# Patient Record
Sex: FEMALE | Race: WHITE | NOT HISPANIC OR LATINO | Employment: FULL TIME | URBAN - METROPOLITAN AREA
[De-identification: names, ages, dates, MRNs, and addresses within clinical notes are randomized per-mention and may not be internally consistent; named-entity substitution may affect disease eponyms.]

---

## 2018-04-13 ENCOUNTER — OFFICE VISIT (OUTPATIENT)
Dept: OBSTETRICS AND GYNECOLOGY | Facility: CLINIC | Age: 33
End: 2018-04-13
Payer: COMMERCIAL

## 2018-04-13 VITALS
SYSTOLIC BLOOD PRESSURE: 121 MMHG | HEIGHT: 64 IN | DIASTOLIC BLOOD PRESSURE: 81 MMHG | BODY MASS INDEX: 36.37 KG/M2 | WEIGHT: 213 LBS

## 2018-04-13 DIAGNOSIS — Z12.4 SCREENING FOR CERVICAL CANCER: ICD-10-CM

## 2018-04-13 DIAGNOSIS — Z11.3 SCREENING FOR GONORRHEA: ICD-10-CM

## 2018-04-13 DIAGNOSIS — Z01.419 ENCOUNTER FOR GYNECOLOGICAL EXAMINATION (GENERAL) (ROUTINE) WITHOUT ABNORMAL FINDINGS: Primary | ICD-10-CM

## 2018-04-13 DIAGNOSIS — B17.10 ACUTE HEPATITIS C VIRUS INFECTION WITHOUT HEPATIC COMA: ICD-10-CM

## 2018-04-13 PROCEDURE — 88175 CYTOPATH C/V AUTO FLUID REDO: CPT

## 2018-04-13 PROCEDURE — 99385 PREV VISIT NEW AGE 18-39: CPT | Mod: S$GLB,,, | Performed by: OBSTETRICS & GYNECOLOGY

## 2018-04-13 PROCEDURE — 99999 PR PBB SHADOW E&M-NEW PATIENT-LVL II: CPT | Mod: PBBFAC,,, | Performed by: OBSTETRICS & GYNECOLOGY

## 2018-04-13 PROCEDURE — 87491 CHLMYD TRACH DNA AMP PROBE: CPT

## 2018-04-13 RX ORDER — VALACYCLOVIR HYDROCHLORIDE 500 MG/1
500 TABLET, FILM COATED ORAL
COMMUNITY
Start: 2018-03-13 | End: 2018-04-19 | Stop reason: SDUPTHER

## 2018-04-13 RX ORDER — NAPROXEN 500 MG/1
250 TABLET ORAL
COMMUNITY
Start: 2016-12-27 | End: 2019-12-13 | Stop reason: ALTCHOICE

## 2018-04-13 RX ORDER — FLUTICASONE FUROATE AND VILANTEROL TRIFENATATE 200; 25 UG/1; UG/1
POWDER RESPIRATORY (INHALATION)
COMMUNITY
Start: 2018-03-05 | End: 2018-11-26

## 2018-04-13 RX ORDER — TEMAZEPAM 30 MG/1
30 CAPSULE ORAL ONCE
COMMUNITY
Start: 2018-03-29 | End: 2021-12-01

## 2018-04-13 RX ORDER — EPINEPHRINE 0.15 MG/.3ML
0.15 INJECTION INTRAMUSCULAR
COMMUNITY
End: 2021-12-01

## 2018-04-13 RX ORDER — DEXTROAMPHETAMINE SACCHARATE, AMPHETAMINE ASPARTATE, DEXTROAMPHETAMINE SULFATE AND AMPHETAMINE SULFATE 5; 5; 5; 5 MG/1; MG/1; MG/1; MG/1
1 TABLET ORAL 2 TIMES DAILY
COMMUNITY
Start: 2018-04-06 | End: 2018-11-26 | Stop reason: SDUPTHER

## 2018-04-13 RX ORDER — LEDIPASVIR AND SOFOSBUVIR 90; 400 MG/1; MG/1
90-400 TABLET, FILM COATED ORAL ONCE
COMMUNITY
Start: 2018-04-09 | End: 2018-11-26

## 2018-04-13 NOTE — PROGRESS NOTES
Subjective:       Patient ID: Radha Mai is a 33 y.o. female.    Chief Complaint:  Gynecologic Exam      History of Present Illness  HPI  Annual Exam-Premenopausal  Patient presents for annual exam. The patient has no complaints today. The patient is sexually active--but ab. GYN screening history: last pap: approximate date 2017 and was normal. The patient wears seatbelts: yes. The patient participates in regular exercise: no. Has the patient ever been transfused or tattooed?: no. The patient reports that there is not domestic violence in her life.    Menses monthly, flow 4-5 d; pads--super; change q 6 hrs; no double up; no soak through; mod dysmenorrhea--relief with naprosyn;         GYN & OB History  Patient's last menstrual period was 2018.   Date of Last Pap: No result found    OB History    Para Term  AB Living   0 0 0 0 0 0   SAB TAB Ectopic Multiple Live Births   0 0 0 0 0             Review of Systems  Review of Systems   Constitutional: Negative for activity change, appetite change, chills, diaphoresis, fatigue, fever and unexpected weight change.   HENT: Negative for mouth sores and tinnitus.    Eyes: Negative for discharge and visual disturbance.   Respiratory: Negative for cough, shortness of breath and wheezing.    Cardiovascular: Negative for chest pain, palpitations and leg swelling.   Gastrointestinal: Negative for abdominal pain, bloating, blood in stool, constipation, diarrhea, nausea and vomiting.   Endocrine: Negative for diabetes, hair loss, hot flashes, hyperthyroidism and hypothyroidism.   Genitourinary: Negative for decreased libido, dyspareunia, dysuria, flank pain, frequency, genital sores, hematuria, menorrhagia, menstrual problem, pelvic pain, urgency, vaginal bleeding, vaginal discharge, vaginal pain, dysmenorrhea, urinary incontinence, postcoital bleeding, postmenopausal bleeding and vaginal odor.   Musculoskeletal: Negative for back pain and myalgias.   Skin:   Negative for rash, no acne and hair changes.   Neurological: Negative for seizures, syncope, numbness and headaches.   Hematological: Negative for adenopathy. Does not bruise/bleed easily.   Psychiatric/Behavioral: Negative for depression and sleep disturbance. The patient is not nervous/anxious.    Breast: Negative for breast mass, breast pain, nipple discharge and skin changes          Objective:    Physical Exam:   Constitutional: She appears well-developed.     Eyes: Conjunctivae and EOM are normal. Pupils are equal, round, and reactive to light.    Neck: Normal range of motion. Neck supple.     Pulmonary/Chest: Effort normal. Right breast exhibits no mass, no nipple discharge, no skin change and no tenderness. Left breast exhibits no mass, no nipple discharge, no skin change and no tenderness. Breasts are symmetrical.        Abdominal: Soft.     Genitourinary: Rectum normal, vagina normal and uterus normal. Pelvic exam was performed with patient supine. Cervix is normal. Right adnexum displays no mass and no tenderness. Left adnexum displays no mass and no tenderness. No erythema, bleeding, rectocele, cystocele or unspecified prolapse of vaginal walls in the vagina. No vaginal discharge found. Labial bartholins normal.       Uterus Size: 6 cm   Musculoskeletal: Normal range of motion.       Neurological: She is alert.    Skin: Skin is warm.    Psychiatric: She has a normal mood and affect.          Assessment:     Encounter Diagnoses   Name Primary?    Encounter for gynecological examination (general) (routine) without abnormal findings Yes    Screening for cervical cancer     Screening for gonorrhea     Acute hepatitis C virus infection without hepatic coma                Plan:      Continue annual well woman exam.  Pap today; aware due in 2020, prefers pap to be taken  Gc/ct today  Safe sex  Encouraged diet, exercise, weight loss  Reviewed plan b 1

## 2018-04-14 LAB
C TRACH DNA SPEC QL NAA+PROBE: NOT DETECTED
N GONORRHOEA DNA SPEC QL NAA+PROBE: NOT DETECTED

## 2018-04-19 NOTE — TELEPHONE ENCOUNTER
----- Message from Trinidad Ivory sent at 4/19/2018 11:08 AM CDT -----  Contact: pt   1. What is the name of the medication you are requesting? valACYclovir (VALTREX)  2. What is the dose? 500 mg   3. How do you take the medication? Orally, topically, etc? Orally   4. How often do you take this medication? As needed   5. Do you need a 30 day or 90 day supply? 30  6. How many refills are you requesting? 1  7. What is your preferred pharmacy and location of the pharmacy?   [facility] [address] [address]  Phone: [phone] Fax: [phone]  8. Who can we contact with further questions? the patient

## 2018-04-20 NOTE — TELEPHONE ENCOUNTER
----- Message from Rabia Rivers sent at 4/20/2018  2:35 PM CDT -----  Contact: pt  1. What is the name of the medication you are requesting? Generic valtrex  2. What is the dose? ?  3. How do you take the medication? Orally, topically, etc? orally  4. How often do you take this medication? As needed  5. Do you need a 30 day or 90 day supply? ?  6. How many refills are you requesting? ?  7. What is your preferred pharmacy and location of the pharmacy?   [facility] [address] [address]  Phone: [phone] Fax: [phone]  8. Who can we contact with further questions? Pt at [phone]  Thank you

## 2018-04-20 NOTE — TELEPHONE ENCOUNTER
Pt's last annual 04/13/2018. Pt requesting refill on valtrex. Medication pended. Pharmacy up to date. Please advise.

## 2018-04-23 RX ORDER — VALACYCLOVIR HYDROCHLORIDE 500 MG/1
500 TABLET, FILM COATED ORAL DAILY
Qty: 30 TABLET | Refills: 11 | Status: SHIPPED | OUTPATIENT
Start: 2018-04-23 | End: 2019-12-13 | Stop reason: SDUPTHER

## 2018-11-26 ENCOUNTER — LAB VISIT (OUTPATIENT)
Dept: LAB | Facility: HOSPITAL | Age: 33
End: 2018-11-26
Attending: OBSTETRICS & GYNECOLOGY
Payer: COMMERCIAL

## 2018-11-26 ENCOUNTER — OFFICE VISIT (OUTPATIENT)
Dept: OBSTETRICS AND GYNECOLOGY | Facility: CLINIC | Age: 33
End: 2018-11-26
Payer: COMMERCIAL

## 2018-11-26 VITALS — SYSTOLIC BLOOD PRESSURE: 124 MMHG | DIASTOLIC BLOOD PRESSURE: 84 MMHG | WEIGHT: 229.5 LBS | BODY MASS INDEX: 39.39 KG/M2

## 2018-11-26 DIAGNOSIS — N92.6 IRREGULAR BLEEDING: ICD-10-CM

## 2018-11-26 DIAGNOSIS — N92.6 IRREGULAR BLEEDING: Primary | ICD-10-CM

## 2018-11-26 PROCEDURE — 83520 IMMUNOASSAY QUANT NOS NONAB: CPT

## 2018-11-26 PROCEDURE — 3008F BODY MASS INDEX DOCD: CPT | Mod: CPTII,S$GLB,, | Performed by: OBSTETRICS & GYNECOLOGY

## 2018-11-26 PROCEDURE — 36415 COLL VENOUS BLD VENIPUNCTURE: CPT | Mod: PO

## 2018-11-26 PROCEDURE — 99213 OFFICE O/P EST LOW 20 MIN: CPT | Mod: S$GLB,,, | Performed by: OBSTETRICS & GYNECOLOGY

## 2018-11-26 PROCEDURE — 99999 PR PBB SHADOW E&M-EST. PATIENT-LVL III: CPT | Mod: PBBFAC,,, | Performed by: OBSTETRICS & GYNECOLOGY

## 2018-11-26 RX ORDER — ONDANSETRON 4 MG/1
TABLET, ORALLY DISINTEGRATING ORAL
COMMUNITY
Start: 2018-08-06 | End: 2021-12-01

## 2018-11-26 RX ORDER — DEXTROAMPHETAMINE SACCHARATE, AMPHETAMINE ASPARTATE MONOHYDRATE, DEXTROAMPHETAMINE SULFATE AND AMPHETAMINE SULFATE 7.5; 7.5; 7.5; 7.5 MG/1; MG/1; MG/1; MG/1
30 CAPSULE, EXTENDED RELEASE ORAL EVERY MORNING
COMMUNITY
End: 2021-12-01

## 2018-11-26 RX ORDER — IBUPROFEN 800 MG/1
TABLET ORAL
COMMUNITY
Start: 2018-08-06 | End: 2021-12-01

## 2018-11-26 RX ORDER — ALBUTEROL SULFATE 90 UG/1
AEROSOL, METERED RESPIRATORY (INHALATION)
COMMUNITY
Start: 2018-07-25 | End: 2021-12-01

## 2018-11-26 RX ORDER — MONTELUKAST SODIUM 10 MG/1
TABLET ORAL
COMMUNITY
Start: 2018-11-16 | End: 2021-12-01

## 2018-11-26 NOTE — PROGRESS NOTES
Subjective:       Patient ID: Radha Mai is a 33 y.o. female.    Chief Complaint:  Metrorrhagia      History of Present Illness  HPI  here for problem   Worried that menstrual cycle is changing --sometimes 1 day late or 1 day early  Also reports having bled for 1 d in October then bleeding stopped then resumed  Trying to conceive--worried  weigth 6lb higher since 2018    GYN & OB History  Patient's last menstrual period was 10/28/2018 (exact date).   Date of Last Pap: 2018    OB History    Para Term  AB Living   2 0 0 0 2 0   SAB TAB Ectopic Multiple Live Births   2 0 0 0 0      # Outcome Date GA Lbr Rosalio/2nd Weight Sex Delivery Anes PTL Lv   2 SAB            1 2012                  Review of Systems  Review of Systems   Genitourinary: Positive for menstrual problem.   All other systems reviewed and are negative.          Objective:      Physical Exam:   Constitutional: She appears well-developed.     Eyes: Conjunctivae and EOM are normal. Pupils are equal, round, and reactive to light.    Neck: Normal range of motion. Neck supple.     Pulmonary/Chest: Effort normal.        Abdominal: Soft.             Musculoskeletal: Normal range of motion.       Neurological: She is alert.    Skin: Skin is warm.    Psychiatric: She has a normal mood and affect.           Assessment:        1. Irregular bleeding               Plan:      Reassurance given  Strongly encouraged weight loss, diet, exercise  Continue menstrual calendar; reviewed use of ovulation predictor kits; however emphasized weight loss of 30-50lbs  amh today  Aware ww exam due after 2019      no fever

## 2018-11-29 LAB — MIS SERPL-MCNC: 1.6 NG/ML (ref 0.9–9.5)

## 2019-12-10 ENCOUNTER — TELEPHONE (OUTPATIENT)
Dept: OBSTETRICS AND GYNECOLOGY | Facility: CLINIC | Age: 34
End: 2019-12-10

## 2019-12-10 NOTE — TELEPHONE ENCOUNTER
Left messages for the pt. to call back. slee,lpn    Pt is due for a WWE also wants her acyclovir refilled. slee,lpn      ----- Message from Farhat Cooper sent at 12/10/2019 12:33 PM CST -----  Contact: pt   Type:  RX Refill Request    Who Called:  Pt   Refill or New Rx:refill   RX Name and Strength:valcyclovir ( sp)   How is the patient currently taking it? (ex. 1XDay): once daily   Is this a 30 day or 90 day RX: 90 days   Preferred Pharm[facility] with phone number:[facility]   Local or Mail Order: local    Ordering Provider:jeff   Would the patient rather a call back or a response via [facility]? phone  Best Call Back Number: [phone]  Additional Information:     [facility] [place] - [address]  [address] [address]  Phone: [phone] Fax: [phone]

## 2019-12-13 ENCOUNTER — OFFICE VISIT (OUTPATIENT)
Dept: OBSTETRICS AND GYNECOLOGY | Facility: CLINIC | Age: 34
End: 2019-12-13
Payer: COMMERCIAL

## 2019-12-13 VITALS
DIASTOLIC BLOOD PRESSURE: 82 MMHG | BODY MASS INDEX: 37.52 KG/M2 | SYSTOLIC BLOOD PRESSURE: 118 MMHG | WEIGHT: 218.56 LBS

## 2019-12-13 DIAGNOSIS — Z12.4 SCREENING FOR CERVICAL CANCER: ICD-10-CM

## 2019-12-13 DIAGNOSIS — Z72.51 HIGH RISK HETEROSEXUAL BEHAVIOR: ICD-10-CM

## 2019-12-13 DIAGNOSIS — Z01.419 ENCOUNTER FOR GYNECOLOGICAL EXAMINATION (GENERAL) (ROUTINE) WITHOUT ABNORMAL FINDINGS: Primary | ICD-10-CM

## 2019-12-13 PROCEDURE — 87801 DETECT AGNT MULT DNA AMPLI: CPT

## 2019-12-13 PROCEDURE — 99395 PREV VISIT EST AGE 18-39: CPT | Mod: S$GLB,,, | Performed by: OBSTETRICS & GYNECOLOGY

## 2019-12-13 PROCEDURE — 99999 PR PBB SHADOW E&M-EST. PATIENT-LVL III: CPT | Mod: PBBFAC,,, | Performed by: OBSTETRICS & GYNECOLOGY

## 2019-12-13 PROCEDURE — 99395 PR PREVENTIVE VISIT,EST,18-39: ICD-10-PCS | Mod: S$GLB,,, | Performed by: OBSTETRICS & GYNECOLOGY

## 2019-12-13 PROCEDURE — 87491 CHLMYD TRACH DNA AMP PROBE: CPT

## 2019-12-13 PROCEDURE — 87481 CANDIDA DNA AMP PROBE: CPT | Mod: 59

## 2019-12-13 PROCEDURE — 99999 PR PBB SHADOW E&M-EST. PATIENT-LVL III: ICD-10-PCS | Mod: PBBFAC,,, | Performed by: OBSTETRICS & GYNECOLOGY

## 2019-12-13 RX ORDER — FLUTICASONE PROPIONATE 50 MCG
1 SPRAY, SUSPENSION (ML) NASAL 2 TIMES DAILY
Refills: 0 | COMMUNITY
Start: 2019-10-28 | End: 2021-12-01

## 2019-12-13 RX ORDER — VALACYCLOVIR HYDROCHLORIDE 500 MG/1
500 TABLET, FILM COATED ORAL DAILY
Qty: 30 TABLET | Refills: 0 | Status: SHIPPED | OUTPATIENT
Start: 2019-12-13 | End: 2019-12-13 | Stop reason: SDUPTHER

## 2019-12-13 RX ORDER — VALACYCLOVIR HYDROCHLORIDE 500 MG/1
500 TABLET, FILM COATED ORAL DAILY
Qty: 90 TABLET | Refills: 3 | Status: SHIPPED | OUTPATIENT
Start: 2019-12-13 | End: 2022-03-03 | Stop reason: SDUPTHER

## 2019-12-13 RX ORDER — CETIRIZINE HYDROCHLORIDE 10 MG/1
10 TABLET ORAL DAILY
Refills: 3 | COMMUNITY
Start: 2019-10-28 | End: 2021-12-01

## 2019-12-13 RX ORDER — MUPIROCIN 20 MG/G
OINTMENT TOPICAL
Refills: 0 | COMMUNITY
Start: 2019-11-16 | End: 2021-12-01

## 2019-12-13 RX ORDER — BUDESONIDE AND FORMOTEROL FUMARATE DIHYDRATE 160; 4.5 UG/1; UG/1
AEROSOL RESPIRATORY (INHALATION)
Refills: 0 | COMMUNITY
Start: 2019-12-05 | End: 2021-12-01

## 2019-12-13 RX ORDER — ASPIRIN 325 MG
50000 TABLET, DELAYED RELEASE (ENTERIC COATED) ORAL
Refills: 3 | COMMUNITY
Start: 2019-11-12 | End: 2021-12-01

## 2019-12-13 NOTE — PROGRESS NOTES
Subjective:       Patient ID: Radha Mai is a 34 y.o. female.    Chief Complaint:  Well Woman      History of Present Illness  HPI  Annual Exam-Premenopausal  Patient presents for annual exam. The patient has no complaints today. The patient is sexually active--condoms for contracept; did have condom break; used plan b 1 wk ago; . GYN screening history: last pap: approximate date 2018 and was normal. The patient wears seatbelts: yes. The patient participates in regular exercise: yes. --walks 3-4 times/wk; 30 min; Has the patient ever been transfused or tattooed?: no. The patient reports that there is not domestic violence in her life.      Menses monthly,  Flow 4-5 day; pads-reg; change q 4 hrs; no double up; dysmenorrhea worse on 1st and 2nd day sometimes the last day; relief with ibuprofen;     Wants refill on valtrex  Denies urinary leakage with cough/sneeze    GYN & OB History  Patient's last menstrual period was 2019.   Date of Last Pap: 2018    OB History    Para Term  AB Living   2 0 0 0 2 0   SAB TAB Ectopic Multiple Live Births   2 0 0 0 0      # Outcome Date GA Lbr Rosalio/2nd Weight Sex Delivery Anes PTL Lv   2 SAB            1 2012               Review of Systems  Review of Systems   All other systems reviewed and are negative.          Objective:      Physical Exam:   Constitutional: She appears well-developed.     Eyes: Pupils are equal, round, and reactive to light. Conjunctivae and EOM are normal.    Neck: Normal range of motion. Neck supple.     Pulmonary/Chest: Effort normal. Right breast exhibits no mass, no nipple discharge, no skin change and no tenderness. Left breast exhibits no mass, no nipple discharge, no skin change and no tenderness. Breasts are symmetrical.        Abdominal: Soft.     Genitourinary: Rectum normal, vagina normal and uterus normal. Pelvic exam was performed with patient supine. Cervix is normal. Right adnexum displays no mass and no  tenderness. Left adnexum displays no mass and no tenderness. No erythema, bleeding, rectocele, cystocele or unspecified prolapse of vaginal walls in the vagina. No vaginal discharge found. Labial bartholins normal.       Uterus Size: 6 cm   Musculoskeletal: Normal range of motion.       Neurological: She is alert.    Skin: Skin is warm.    Psychiatric: She has a normal mood and affect.           Assessment:        Encounter Diagnoses   Name Primary?    Encounter for gynecological examination (general) (routine) without abnormal findings Yes    Screening for cervical cancer     High risk heterosexual behavior                Plan:      Continue annual well woman exam.  Pap 2018, due in 2021; Reviewed updated recommendations for pap smears (every 3 years) in low risk patients.   Recommend annual pelvic exams.  Reviewed recommendations for annual CBE.  Gc/ct/affirm today  Declines hiv/rpr testing  Continue diet, exercise, weight loss  Continue ibuprofen for dysmenorrhea  Continue daily valtrex

## 2019-12-15 LAB
C TRACH DNA SPEC QL NAA+PROBE: NOT DETECTED
N GONORRHOEA DNA SPEC QL NAA+PROBE: NOT DETECTED

## 2019-12-16 LAB
BACTERIAL VAGINOSIS DNA: NEGATIVE
CANDIDA GLABRATA DNA: NEGATIVE
CANDIDA KRUSEI DNA: NEGATIVE
CANDIDA RRNA VAG QL PROBE: NEGATIVE
T VAGINALIS RRNA GENITAL QL PROBE: NEGATIVE

## 2020-05-04 ENCOUNTER — TELEPHONE (OUTPATIENT)
Dept: OBSTETRICS AND GYNECOLOGY | Facility: CLINIC | Age: 35
End: 2020-05-04

## 2020-05-04 ENCOUNTER — PATIENT MESSAGE (OUTPATIENT)
Dept: ADMINISTRATIVE | Facility: OTHER | Age: 35
End: 2020-05-04

## 2020-05-04 NOTE — TELEPHONE ENCOUNTER
----- Message from Cherrie Luis sent at 5/4/2020  9:37 AM CDT -----  Contact: pt  1. What is the name of the medication you are requesting? Birth control  2. What is the dose? .  3. How do you take the medication? Orally, topically, etc? .  4. How often do you take this medication? .  5. Do you need a 30 day or 90 day supply? .  6. How many refills are you requesting? .  7. What is your preferred pharmacy and location of the pharmacy? [facility] in [place]  8. Who can we contact with further questions? .

## 2020-05-07 ENCOUNTER — OFFICE VISIT (OUTPATIENT)
Dept: OBSTETRICS AND GYNECOLOGY | Facility: CLINIC | Age: 35
End: 2020-05-07
Payer: COMMERCIAL

## 2020-05-07 DIAGNOSIS — Z30.011 ENCOUNTER FOR INITIAL PRESCRIPTION OF CONTRACEPTIVE PILLS: Primary | ICD-10-CM

## 2020-05-07 PROCEDURE — 99214 PR OFFICE/OUTPT VISIT, EST, LEVL IV, 30-39 MIN: ICD-10-PCS | Mod: 95,,, | Performed by: OBSTETRICS & GYNECOLOGY

## 2020-05-07 PROCEDURE — 99214 OFFICE O/P EST MOD 30 MIN: CPT | Mod: 95,,, | Performed by: OBSTETRICS & GYNECOLOGY

## 2020-05-07 RX ORDER — NORGESTIMATE AND ETHINYL ESTRADIOL 7DAYSX3 28
1 KIT ORAL DAILY
Qty: 28 TABLET | Refills: 7 | Status: SHIPPED | OUTPATIENT
Start: 2020-05-07 | End: 2020-05-19 | Stop reason: SDUPTHER

## 2020-05-07 NOTE — PROGRESS NOTES
Subjective:       Patient ID: Radha Mai is a 35 y.o. female.    Chief Complaint:  No chief complaint on file.      History of Present Illness  HPI     The patient location is: home, [place]  The chief complaint leading to consultation is: contraception  Visit type: audiovisual  Total time spent with patient: 15 min  Each patient to whom he or she provides medical services by telemedicine is:  (1) informed of the relationship between the physician and patient and the respective role of any other health care provider with respect to management of the patient; and (2) notified that he or she may decline to receive medical services by telemedicine and may withdraw from such care at any time.    Pt reports interest in resuming contraception  Has been off ocp for 5 yrs  Interested in resuming due to cystic acne as well as pregnancy prevention  Reports menses monthly, flow mod heavy and reports dysmenorrhea  Non smoker  ww exam due in 2020  GYN & OB History  No LMP recorded.   Date of Last Pap: 2018    OB History    Para Term  AB Living   2 0 0 0 2 0   SAB TAB Ectopic Multiple Live Births   2 0 0 0 0      # Outcome Date GA Lbr Rosalio/2nd Weight Sex Delivery Anes PTL Lv   2 2012           1 2012               Review of Systems  Review of Systems   Integumentary:  Positive for acne.   All other systems reviewed and are negative.          Objective:      Physical Exam:   Constitutional: She is oriented to person, place, and time. She appears well-developed and well-nourished. No distress.     Eyes: Conjunctivae are normal.    Neck: Normal range of motion.     Pulmonary/Chest: Effort normal.                  Musculoskeletal: Normal range of motion.       Neurological: She is alert and oriented to person, place, and time.    Skin: She is not diaphoretic.    Psychiatric: She has a normal mood and affect. Her behavior is normal. Judgment and thought content normal.           Assessment:          Encounter Diagnosis   Name Primary?    Encounter for initial prescription of contraceptive pills Yes          Plan:   Reviewed contraceptive options--ocp, depo, nuva ring, nexplanon, iud, patch  Reviewed uses of each, risks and benefits.  Pt elects trial of ocp  Reviewed Sunday start, safe sex; daily use of ocp; aware may have irregular bleeding for first 3 mo  Reminded may have breast tenderness; nausea; symptoms should resolve by mo 4  rx sent for ortho tricyclen  Aware ww exam due 12/2020

## 2020-05-18 ENCOUNTER — PATIENT MESSAGE (OUTPATIENT)
Dept: OBSTETRICS AND GYNECOLOGY | Facility: CLINIC | Age: 35
End: 2020-05-18

## 2020-05-18 DIAGNOSIS — Z30.011 ENCOUNTER FOR INITIAL PRESCRIPTION OF CONTRACEPTIVE PILLS: Primary | ICD-10-CM

## 2020-05-19 ENCOUNTER — PATIENT MESSAGE (OUTPATIENT)
Dept: OBSTETRICS AND GYNECOLOGY | Facility: CLINIC | Age: 35
End: 2020-05-19

## 2020-05-19 RX ORDER — NORGESTIMATE AND ETHINYL ESTRADIOL 7DAYSX3 28
1 KIT ORAL DAILY
Qty: 84 TABLET | Refills: 3 | Status: SHIPPED | OUTPATIENT
Start: 2020-05-19 | End: 2021-05-03 | Stop reason: SDUPTHER

## 2021-04-29 ENCOUNTER — PATIENT MESSAGE (OUTPATIENT)
Dept: RESEARCH | Facility: HOSPITAL | Age: 36
End: 2021-04-29

## 2021-05-03 ENCOUNTER — TELEPHONE (OUTPATIENT)
Dept: OBSTETRICS AND GYNECOLOGY | Facility: CLINIC | Age: 36
End: 2021-05-03

## 2021-05-03 DIAGNOSIS — Z30.011 ENCOUNTER FOR INITIAL PRESCRIPTION OF CONTRACEPTIVE PILLS: ICD-10-CM

## 2021-05-03 RX ORDER — NORGESTIMATE AND ETHINYL ESTRADIOL 7DAYSX3 28
1 KIT ORAL DAILY
Qty: 30 TABLET | Refills: 1 | Status: SHIPPED | OUTPATIENT
Start: 2021-05-03 | End: 2021-12-01

## 2021-06-02 ENCOUNTER — NURSE TRIAGE (OUTPATIENT)
Dept: ADMINISTRATIVE | Facility: CLINIC | Age: 36
End: 2021-06-02

## 2022-09-30 ENCOUNTER — PATIENT MESSAGE (OUTPATIENT)
Dept: CARDIOLOGY | Facility: CLINIC | Age: 37
End: 2022-09-30
Payer: COMMERCIAL